# Patient Record
Sex: MALE | Race: OTHER | NOT HISPANIC OR LATINO | URBAN - METROPOLITAN AREA
[De-identification: names, ages, dates, MRNs, and addresses within clinical notes are randomized per-mention and may not be internally consistent; named-entity substitution may affect disease eponyms.]

---

## 2024-06-02 ENCOUNTER — EMERGENCY (EMERGENCY)
Facility: HOSPITAL | Age: 44
LOS: 0 days | Discharge: HOME | End: 2024-06-02
Attending: EMERGENCY MEDICINE
Payer: SELF-PAY

## 2024-06-02 VITALS
DIASTOLIC BLOOD PRESSURE: 80 MMHG | TEMPERATURE: 98 F | HEIGHT: 68 IN | HEART RATE: 107 BPM | OXYGEN SATURATION: 96 % | RESPIRATION RATE: 19 BRPM | WEIGHT: 164.91 LBS | SYSTOLIC BLOOD PRESSURE: 118 MMHG
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
## 2024-06-02 DIAGNOSIS — S82.841A DISPLACED BIMALLEOLAR FRACTURE OF RIGHT LOWER LEG, INITIAL ENCOUNTER FOR CLOSED FRACTURE: ICD-10-CM
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
## 2024-06-02 DIAGNOSIS — E11.9 TYPE 2 DIABETES MELLITUS WITHOUT COMPLICATIONS: ICD-10-CM

## 2024-06-02 DIAGNOSIS — M25.571 PAIN IN RIGHT ANKLE AND JOINTS OF RIGHT FOOT: ICD-10-CM

## 2024-06-02 DIAGNOSIS — Z91.148 PATIENT'S OTHER NONCOMPLIANCE WITH MEDICATION REGIMEN FOR OTHER REASON: ICD-10-CM

## 2024-06-02 PROCEDURE — 73590 X-RAY EXAM OF LOWER LEG: CPT | Mod: 26,RT

## 2024-06-02 PROCEDURE — 73700 CT LOWER EXTREMITY W/O DYE: CPT | Mod: 26,RT,MC

## 2024-06-02 PROCEDURE — 73610 X-RAY EXAM OF ANKLE: CPT | Mod: 26,RT,76

## 2024-06-02 PROCEDURE — 73630 X-RAY EXAM OF FOOT: CPT | Mod: 26,RT

## 2024-06-02 PROCEDURE — 73600 X-RAY EXAM OF ANKLE: CPT | Mod: 26,RT

## 2024-06-02 PROCEDURE — 73562 X-RAY EXAM OF KNEE 3: CPT | Mod: 26,RT

## 2024-06-02 PROCEDURE — 99285 EMERGENCY DEPT VISIT HI MDM: CPT

## 2024-06-02 RX ORDER — ASPIRIN/CALCIUM CARB/MAGNESIUM 324 MG
1 TABLET ORAL
Qty: 14 | Refills: 0
Start: 2024-06-02 | End: 2024-06-15

## 2024-06-02 RX ORDER — ACETAMINOPHEN 500 MG
975 TABLET ORAL ONCE
Refills: 0 | Status: COMPLETED | OUTPATIENT
Start: 2024-06-02 | End: 2024-06-02

## 2024-06-02 RX ORDER — ACETAMINOPHEN 500 MG
1 TABLET ORAL
Qty: 16 | Refills: 0
Start: 2024-06-02 | End: 2024-06-05

## 2024-06-02 RX ORDER — OXYCODONE HYDROCHLORIDE 5 MG/1
1 TABLET ORAL
Qty: 6 | Refills: 0
Start: 2024-06-02 | End: 2024-06-03

## 2024-06-02 NOTE — ED PROVIDER NOTE - PATIENT PORTAL LINK FT
You can access the FollowMyHealth Patient Portal offered by [facility] by registering at the following website: [email or web address]. By joining [employer]’s FollowMyHealth portal, you will also be able to view your health information using other applications (apps) compatible with our system.

## 2024-06-02 NOTE — ED PROVIDER NOTE - WR ORDER ID 4
8/2/2018     Bk Bhatia  [address] [place] [place] [address]      [employer],    Mr. 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  On his overnight sleep study, we observed mild VELIA with mild sleep-associated hypoxemia.  On the sleep study, we saw an average AHI of ~13.  Given his lack of daytime symptoms, mild VELIA based on AHI, I would not see a strong indication for treatment at this time.    Please call the [facility] at [place] with any questions at [phone].      Sincerely,        Andrew Jenknis MD  Medical Director  [facility]  [address]  [facility] [address]  Phone: [phone]     
[id number]

## 2024-06-02 NOTE — ED PROVIDER NOTE - PROGRESS NOTE DETAILS
JULIO Zacarias; ortho at bedside, reducing ankle and splinting, plan for CT after for outpatient operative planning, ortho recommending 325mg aspirin daily and whatever additional pain medications, meds sent to patients pharmacy, will likely be discharged by night team/Dr Anderson after reduction films and CT. Wife at bedside now, plan reviewed, no questions at this time.

## 2024-06-02 NOTE — ED ADULT TRIAGE NOTE - CHIEF COMPLAINT QUOTE
BIBA for alcohol intox and fell and twisted right foot. No head trauma or LOC. Last alcohol intake 4pm today.

## 2024-06-02 NOTE — ED PROVIDER NOTE - NSFOLLOWUPINSTRUCTIONS_ED_ALL_ED_FT
1) Take tylenol and/or motrin for pain  2) Take prescription medication as instructed  3) No weight bearing to right leg/foot  4) Follow-up with Orthopedics  5) Follow up with your primary care doctor  6) Return to the ER for worsening or concerning symptoms 1) Take tylenol and/or motrin for pain  2) Take prescription medication as instructed  3) No weight bearing to right leg/foot  4) Keep splint clean, dry and intact  5) Follow-up with Orthopedics  6) Follow up with your primary care doctor  7) Return to the ER for worsening or concerning symptoms

## 2024-06-02 NOTE — ED ADULT NURSE REASSESSMENT NOTE - NS ED NURSE REASSESS COMMENT FT1
Received report from DANIEL Dyer . Identified pt and introduced myself as RN. Pt is a&ox3. Safety and fall precautions in place.

## 2024-06-02 NOTE — CONSULT NOTE ADULT - SUBJECTIVE AND OBJECTIVE BOX
HPI  44yMale c/o R ankle pain s/p mechanical fall. Unable to bear weight in the RLE since the fall. Denies headstrike or LOC. Denies numbness/tingling in the RLE. Denies any other trauma/injuries at this time. At baseline, community ambulator w/o assistive devices.    ROS  Negative unless otherwise specified in HPI.    PAST MEDICAL & SURGICAL Hx  PAST MEDICAL & SURGICAL HISTORY:      MEDICATIONS  Home Medications:      ALLERGIES  No Known Allergies      FAMILY Hx  FAMILY HISTORY:      SOCIAL Hx  Social History:      VITALS  Vital Signs Last 24 Hrs  T(C): 36.4 (02 Jun 2024 18:05), Max: 36.4 (02 Jun 2024 18:05)  T(F): 97.5 (02 Jun 2024 18:05), Max: 97.5 (02 Jun 2024 18:05)  HR: 107 (02 Jun 2024 18:05) (107 - 107)  BP: 118/80 (02 Jun 2024 18:05) (118/80 - 118/80)  BP(mean): --  RR: 19 (02 Jun 2024 18:05) (19 - 19)  SpO2: 96% (02 Jun 2024 18:05) (96% - 96%)    Parameters below as of 02 Jun 2024 18:05  Patient On (Oxygen Delivery Method): room air        PHYSICAL EXAM  Gen: Lying in bed, NAD  Resp: No increased WOB  RLE:  Skin intact, +edema over R ankle  +TTP over R ankle, no TTP along remainder of extremity; compartments soft  Limited ROM at ankle 2/2 pain  Motor: TA/EHL/GS/FHL intact  Sensory: DP/SP/Tib/Steven/Saph SILT  +DP pulse    Secondary Assessment:  NC/AT, NTTP of clavicles, NTTP of C-spine,T-spine, or L-spine in the midline and paraspinal areas; NTTP of pelvis  LUE: NTTP of Shoulder, Elbow, Wrist, Hand; NT with AROM/PROM of Shoulder, Elbow, Wrist, Hand; AIN/PIN/Med/Uln/Msc/Rad/Ax intact  RUE: NTTP of Shoulder, Elbow, Wrist, Hand; NT with AROM/PROM of Shoulder, Elbow, Wrist, Hand; AIN/PIN/Med/Uln/Msc/Rad/Ax intact   LLE: Able to SLR, NT with Log Roll, NT with Heel Strike, NTTP of Hip, Knee, Ankle, Foot; NT with AROM/PROM of Hip, Knee, Ankle, Foot; Q/H/GSC/TA/EHL/FHL intact  RLE: Able to SLR, NT with Log Roll, NT with axial load, NTTP of Hip, Knee, Foot; NT with AROM/PROM of Hip, Knee      LABS              IMAGING  XRs: R ramonita equivalent ankle fx (personal read)    PROCEDURE  Closed reduction was performed and a well-padded, well-molded plaster splint applied. The patient tolerated the procedure well without evidence of complications. The patient was neurovascularly intact following reduction. Post-reduction XRs demonstrated acceptable alignment. The patient was informed about splint precautions (keep dry, do not stick anything inside, monitor for signs/symptoms of increased compartmental pressure: uncontrolled pain, worsening numbness/tingling, severe pain with movement of the fingers/toes) and verbalized understanding.    ASSESSMENT & PLAN  44yMale w/ R ramonita equivalent ankle fx s/p closed reduction and immobilization.  -NWB RLE in a short-leg trilam splint  -splint precautions  -pain control  -ice/cold compress, elevation  -no acute ortho surgery at this time, ortho stable for DC  -pt prefers to FU outpt with surgeon in [place]  -in unable to make appt recommend f/u outpt with Dr. Lux within 1 week, call office for appt

## 2024-06-02 NOTE — ED ADULT NURSE NOTE - NSFALLRISKINTERV_ED_ALL_ED

## 2024-06-02 NOTE — ED PROVIDER NOTE - CARE PROVIDER_API CALL
Star Lux  Orthopaedic Surgery  [address], [address]  [place] [address]  Phone: [phone]  Fax: [phone]  Follow Up Time:

## 2024-06-02 NOTE — ED PROVIDER NOTE - CLINICAL SUMMARY MEDICAL DECISION MAKING FREE TEXT BOX
44-year-old male with past medical history of diabetes previously on metformin but noncompliant with medications presents emergency room for foot pain/injury.  Patient states he had 2 beers today and twisted his right ankle.  Reports pain to the outside of his right ankle, did not hit his head, lose consciousness. Vital signs stable, R ankle swelling, dec ROM, lateral malleolus tenderness, concern for fracture, less likely sprain. Will give meds, XR, ortho consult and reassess.

## 2024-06-02 NOTE — ED PROVIDER NOTE - OBJECTIVE STATEMENT
44-year-old male with past medical history of diabetes previously on metformin but noncompliant with medications presents emergency room for foot pain/injury.  Patient states he had 2 beers today and twisted his right ankle.  Reports pain to the outside of his right ankle, did not hit his head, lose consciousness.  Denies numbness, tingling, pain, nausea, vomiting, diarrhea or urinary complaints.  No pain medications given prior to arrival.

## 2024-06-02 NOTE — ED ADULT NURSE NOTE - OBJECTIVE STATEMENT
44 year old male a/ox3 c/o right foot pain after falling at a party today, pt reports he slipped, denies head strike and loc, denies dizziness and lightheadedness, pt reports drinking 2 beers, pt denies drinking everyday, reports history of diabetes but is not compliant on medications

## 2024-06-14 PROBLEM — Z00.00 ENCOUNTER FOR PREVENTIVE HEALTH EXAMINATION: Status: ACTIVE | Noted: 2024-06-14

## 2024-07-10 ENCOUNTER — APPOINTMENT (OUTPATIENT)
Dept: ORTHOPEDIC SURGERY | Facility: HOSPITAL | Age: 44
End: 2024-07-10

## 2024-07-10 VITALS
WEIGHT: 160 LBS | HEART RATE: 104 BPM | SYSTOLIC BLOOD PRESSURE: 116 MMHG | HEIGHT: 69 IN | TEMPERATURE: 97.7 F | DIASTOLIC BLOOD PRESSURE: 81 MMHG | BODY MASS INDEX: 23.7 KG/M2

## 2024-07-10 DIAGNOSIS — S82.891D OTHER FRACTURE OF RIGHT LOWER LEG, SUBSEQUENT ENCOUNTER FOR CLOSED FRACTURE WITH ROUTINE HEALING: ICD-10-CM

## 2024-07-10 DIAGNOSIS — S82.899A OTHER FRACTURE OF UNSPECIFIED LOWER LEG, INITIAL ENCOUNTER FOR CLOSED FRACTURE: ICD-10-CM

## 2024-07-10 DIAGNOSIS — S93.431D SPRAIN OF TIBIOFIBULAR LIGAMENT OF RIGHT ANKLE, SUBSEQUENT ENCOUNTER: ICD-10-CM

## 2024-07-11 PROBLEM — S93.431D SYNDESMOTIC DISRUPTION OF RIGHT ANKLE, SUBSEQUENT ENCOUNTER: Status: ACTIVE | Noted: 2024-07-11

## 2024-07-11 PROBLEM — S82.891D ANKLE FRACTURE, RIGHT, CLOSED, WITH ROUTINE HEALING, SUBSEQUENT ENCOUNTER: Status: ACTIVE | Noted: 2024-07-11

## 2024-07-31 ENCOUNTER — OUTPATIENT (OUTPATIENT)
Dept: OUTPATIENT SERVICES | Facility: HOSPITAL | Age: 44
LOS: 1 days | End: 2024-07-31
Payer: COMMERCIAL

## 2024-07-31 ENCOUNTER — APPOINTMENT (OUTPATIENT)
Dept: ORTHOPEDIC SURGERY | Facility: HOSPITAL | Age: 44
End: 2024-07-31

## 2024-07-31 VITALS
HEIGHT: 69 IN | BODY MASS INDEX: 23.7 KG/M2 | SYSTOLIC BLOOD PRESSURE: 101 MMHG | TEMPERATURE: 97.7 F | HEART RATE: 85 BPM | DIASTOLIC BLOOD PRESSURE: 81 MMHG | WEIGHT: 160 LBS

## 2024-07-31 DIAGNOSIS — S82.891D OTHER FRACTURE OF RIGHT LOWER LEG, SUBSEQUENT ENCOUNTER FOR CLOSED FRACTURE WITH ROUTINE HEALING: ICD-10-CM

## 2024-07-31 PROCEDURE — 73610 X-RAY EXAM OF ANKLE: CPT | Mod: 26,RT

## 2024-08-01 DIAGNOSIS — S82.891D OTHER FRACTURE OF RIGHT LOWER LEG, SUBSEQUENT ENCOUNTER FOR CLOSED FRACTURE WITH ROUTINE HEALING: ICD-10-CM

## 2024-08-21 ENCOUNTER — OUTPATIENT (OUTPATIENT)
Dept: OUTPATIENT SERVICES | Facility: HOSPITAL | Age: 44
LOS: 1 days | End: 2024-08-21
Payer: COMMERCIAL

## 2024-08-21 ENCOUNTER — APPOINTMENT (OUTPATIENT)
Dept: ORTHOPEDIC SURGERY | Facility: HOSPITAL | Age: 44
End: 2024-08-21

## 2024-08-21 VITALS
DIASTOLIC BLOOD PRESSURE: 81 MMHG | WEIGHT: 157 LBS | HEIGHT: 69 IN | BODY MASS INDEX: 23.25 KG/M2 | HEART RATE: 99 BPM | SYSTOLIC BLOOD PRESSURE: 110 MMHG | TEMPERATURE: 98.1 F

## 2024-08-21 DIAGNOSIS — S93.431D SPRAIN OF TIBIOFIBULAR LIGAMENT OF RIGHT ANKLE, SUBSEQUENT ENCOUNTER: ICD-10-CM

## 2024-08-21 PROCEDURE — 73600 X-RAY EXAM OF ANKLE: CPT | Mod: 26,RT

## 2024-08-24 NOTE — DISCUSSION/SUMMARY
[de-identified] : Options reviewed, advised CAM boot ambulation, activity modification, HEP.  Return to clinic in 1 week for radiographs review and rehabilitation planning.  All questions answered.

## 2024-08-24 NOTE — PHYSICAL EXAM
[Normal] : Oriented to person, place, and time, insight and judgement were intact and the affect was normal [de-identified] : Extremity: residual soft tissue swelling R ankle, nontender distal fibula R ankle.  Nontender R peroneals, syndesmosis, Achilles, medial malleolus, deltoid, hindfoot ST, midfoot LF and PTT insertional, and forefoot / base 5th metatarsal.  Calves soft and nontender, sensorimotor unchanged, skin intact R LE.  AOx3, mood / affect normal. [de-identified] : TORRES, NAD [de-identified] : ACC: [id number] EXAM: XR ANKLE 2 VIEWS RT ORDERED BY: JAZMYNE ESPINOZA ACC: [id number] EXAM: XR ANKLE COMP MIN 3 VIEWS RT ORDERED BY: JAZMYNE ESPINOZA PROCEDURE DATE: 07/31/2024 INTERPRETATION: CLINICAL INDICATION: follow-up right ankle fracture  EXAM: Frontal, oblique, and lateral right ankle pre and post splinting from 7/31/2024 at 1217. Compared prior study from 7/10/2024.  IMPRESSION: Redemonstrated slightly offset obliquely oriented coronal plane distal fibular fracture extending to joint line level with slight overlying soft tissue swelling. Subsequent splint application over the extremity without change in overall configuration or alignment. No dislocations or additional fractures.  Congruent ankle mortise with smooth intact talar dome. Preserved remaining visualized foot joint spaces and no joint margin erosions. No calcaneal spurring and unremarkable distal Achilles tendon shadow.  No discrete lytic or blastic lesions.  --- End of Report ---  CAMILO HODGES MD; Attending Radiologist This document has been electronically signed. Aug 2 2024 9:31AM  Radiographs (8/21/24, ID # [id number]) reveal adequate alignment R ankle mortise / distal fibula fracture.

## 2024-08-24 NOTE — DISCUSSION/SUMMARY
[de-identified] : Options reviewed, advised CAM boot ambulation, activity modification, HEP.  Return to clinic in 1 week for radiographs review and rehabilitation planning.  All questions answered.

## 2024-08-24 NOTE — PHYSICAL EXAM
[Normal] : Oriented to person, place, and time, insight and judgement were intact and the affect was normal [de-identified] : Extremity: residual soft tissue swelling R ankle, nontender distal fibula R ankle.  Nontender R peroneals, syndesmosis, Achilles, medial malleolus, deltoid, hindfoot ST, midfoot LF and PTT insertional, and forefoot / base 5th metatarsal.  Calves soft and nontender, sensorimotor unchanged, skin intact R LE.  AOx3, mood / affect normal. [de-identified] : TORRES, NAD [de-identified] : ACC: [id number] EXAM: XR ANKLE 2 VIEWS RT ORDERED BY: JAZMYNE ESPINOZA ACC: [id number] EXAM: XR ANKLE COMP MIN 3 VIEWS RT ORDERED BY: JAZMYNE ESPINOZA PROCEDURE DATE: 07/31/2024 INTERPRETATION: CLINICAL INDICATION: follow-up right ankle fracture  EXAM: Frontal, oblique, and lateral right ankle pre and post splinting from 7/31/2024 at 1217. Compared prior study from 7/10/2024.  IMPRESSION: Redemonstrated slightly offset obliquely oriented coronal plane distal fibular fracture extending to joint line level with slight overlying soft tissue swelling. Subsequent splint application over the extremity without change in overall configuration or alignment. No dislocations or additional fractures.  Congruent ankle mortise with smooth intact talar dome. Preserved remaining visualized foot joint spaces and no joint margin erosions. No calcaneal spurring and unremarkable distal Achilles tendon shadow.  No discrete lytic or blastic lesions.  --- End of Report ---  CAMILO HODGES MD; Attending Radiologist This document has been electronically signed. Aug 2 2024 9:31AM  Radiographs (8/21/24, ID # [id number]) reveal adequate alignment R ankle mortise / distal fibula fracture.

## 2024-08-24 NOTE — HISTORY OF PRESENT ILLNESS
[FreeTextEntry1] : Follow-up R ankle fracture (6/2/24), wearing flip-flops for clinic visit today [late arrival], states occasional use of CAM boot NC.  PMH includes DM.

## 2024-08-26 DIAGNOSIS — S82.891D OTHER FRACTURE OF RIGHT LOWER LEG, SUBSEQUENT ENCOUNTER FOR CLOSED FRACTURE WITH ROUTINE HEALING: ICD-10-CM

## 2024-08-26 DIAGNOSIS — Z86.39 PERSONAL HISTORY OF OTHER ENDOCRINE, NUTRITIONAL AND METABOLIC DISEASE: ICD-10-CM

## 2024-08-26 DIAGNOSIS — I99.8 OTHER DISORDER OF CIRCULATORY SYSTEM: ICD-10-CM

## 2024-08-28 ENCOUNTER — APPOINTMENT (OUTPATIENT)
Dept: ORTHOPEDIC SURGERY | Facility: HOSPITAL | Age: 44
End: 2024-08-28

## 2024-08-28 ENCOUNTER — OUTPATIENT (OUTPATIENT)
Dept: OUTPATIENT SERVICES | Facility: HOSPITAL | Age: 44
LOS: 1 days | End: 2024-08-28
Payer: COMMERCIAL

## 2024-08-28 VITALS
WEIGHT: 161 LBS | BODY MASS INDEX: 23.85 KG/M2 | HEART RATE: 92 BPM | DIASTOLIC BLOOD PRESSURE: 74 MMHG | SYSTOLIC BLOOD PRESSURE: 103 MMHG | TEMPERATURE: 97.9 F | HEIGHT: 69 IN

## 2024-08-28 VITALS — HEIGHT: 66 IN | BODY MASS INDEX: 25.99 KG/M2

## 2024-08-28 DIAGNOSIS — Z87.891 PERSONAL HISTORY OF NICOTINE DEPENDENCE: ICD-10-CM

## 2024-08-28 DIAGNOSIS — Z86.39 PERSONAL HISTORY OF OTHER ENDOCRINE, NUTRITIONAL AND METABOLIC DISEASE: ICD-10-CM

## 2024-08-28 DIAGNOSIS — S82.891D OTHER FRACTURE OF RIGHT LOWER LEG, SUBSEQUENT ENCOUNTER FOR CLOSED FRACTURE WITH ROUTINE HEALING: ICD-10-CM

## 2024-08-28 DIAGNOSIS — I99.8 OTHER DISORDER OF CIRCULATORY SYSTEM: ICD-10-CM

## 2024-08-28 PROCEDURE — 73610 X-RAY EXAM OF ANKLE: CPT | Mod: 26,RT

## 2024-08-29 DIAGNOSIS — I99.8 OTHER DISORDER OF CIRCULATORY SYSTEM: ICD-10-CM

## 2024-08-29 DIAGNOSIS — S82.891D OTHER FRACTURE OF RIGHT LOWER LEG, SUBSEQUENT ENCOUNTER FOR CLOSED FRACTURE WITH ROUTINE HEALING: ICD-10-CM

## 2024-08-29 DIAGNOSIS — Z87.891 PERSONAL HISTORY OF NICOTINE DEPENDENCE: ICD-10-CM

## 2024-08-29 DIAGNOSIS — Z86.39 PERSONAL HISTORY OF OTHER ENDOCRINE, NUTRITIONAL AND METABOLIC DISEASE: ICD-10-CM

## 2024-11-13 ENCOUNTER — APPOINTMENT (OUTPATIENT)
Dept: ORTHOPEDIC SURGERY | Facility: HOSPITAL | Age: 44
End: 2024-11-13